# Patient Record
Sex: FEMALE | Race: WHITE | Employment: UNEMPLOYED | URBAN - METROPOLITAN AREA
[De-identification: names, ages, dates, MRNs, and addresses within clinical notes are randomized per-mention and may not be internally consistent; named-entity substitution may affect disease eponyms.]

---

## 2023-05-05 ENCOUNTER — HOSPITAL ENCOUNTER (INPATIENT)
Age: 88
LOS: 6 days | Discharge: NURSING, REHAB OR LONG-TERM CARE | DRG: 371 | End: 2023-05-11
Attending: INTERNAL MEDICINE
Payer: MEDICARE

## 2023-05-05 PROBLEM — G93.40 ACUTE ENCEPHALOPATHY: Status: ACTIVE | Noted: 2023-05-05

## 2023-05-05 PROCEDURE — 1200000000 HC SEMI PRIVATE

## 2023-05-05 RX ORDER — ACETAMINOPHEN 650 MG/1
650 SUPPOSITORY RECTAL EVERY 6 HOURS PRN
Status: DISCONTINUED | OUTPATIENT
Start: 2023-05-05 | End: 2023-05-11 | Stop reason: HOSPADM

## 2023-05-05 RX ORDER — PROMETHAZINE HYDROCHLORIDE 12.5 MG/1
12.5 TABLET ORAL EVERY 6 HOURS PRN
Status: DISCONTINUED | OUTPATIENT
Start: 2023-05-05 | End: 2023-05-11 | Stop reason: HOSPADM

## 2023-05-05 RX ORDER — POLYETHYLENE GLYCOL 3350 17 G/17G
17 POWDER, FOR SOLUTION ORAL DAILY PRN
Status: DISCONTINUED | OUTPATIENT
Start: 2023-05-05 | End: 2023-05-09

## 2023-05-05 RX ORDER — ONDANSETRON 2 MG/ML
4 INJECTION INTRAMUSCULAR; INTRAVENOUS EVERY 6 HOURS PRN
Status: DISCONTINUED | OUTPATIENT
Start: 2023-05-05 | End: 2023-05-11 | Stop reason: HOSPADM

## 2023-05-05 RX ORDER — SODIUM CHLORIDE 0.9 % (FLUSH) 0.9 %
10 SYRINGE (ML) INJECTION EVERY 12 HOURS SCHEDULED
Status: DISCONTINUED | OUTPATIENT
Start: 2023-05-05 | End: 2023-05-11 | Stop reason: HOSPADM

## 2023-05-05 RX ORDER — SODIUM CHLORIDE 9 MG/ML
INJECTION, SOLUTION INTRAVENOUS PRN
Status: DISCONTINUED | OUTPATIENT
Start: 2023-05-05 | End: 2023-05-11 | Stop reason: HOSPADM

## 2023-05-05 RX ORDER — SODIUM CHLORIDE 0.9 % (FLUSH) 0.9 %
10 SYRINGE (ML) INJECTION PRN
Status: DISCONTINUED | OUTPATIENT
Start: 2023-05-05 | End: 2023-05-11 | Stop reason: HOSPADM

## 2023-05-05 RX ORDER — ACETAMINOPHEN 325 MG/1
650 TABLET ORAL EVERY 6 HOURS PRN
Status: DISCONTINUED | OUTPATIENT
Start: 2023-05-05 | End: 2023-05-11 | Stop reason: HOSPADM

## 2023-05-06 PROBLEM — R90.89 ABNORMAL BRAIN MRI: Status: ACTIVE | Noted: 2023-05-06

## 2023-05-06 LAB
ALBUMIN SERPL-MCNC: 2.4 G/DL (ref 3.5–5.2)
ALP SERPL-CCNC: 137 U/L (ref 35–104)
ALT SERPL-CCNC: 39 U/L (ref 0–32)
ANION GAP SERPL CALCULATED.3IONS-SCNC: 11 MMOL/L (ref 7–16)
AST SERPL-CCNC: 39 U/L (ref 0–31)
BASOPHILS # BLD: 0.04 E9/L (ref 0–0.2)
BASOPHILS NFR BLD: 0.4 % (ref 0–2)
BILIRUB SERPL-MCNC: 1 MG/DL (ref 0–1.2)
BUN SERPL-MCNC: 20 MG/DL (ref 6–23)
CALCIUM SERPL-MCNC: 7.4 MG/DL (ref 8.6–10.2)
CHLORIDE SERPL-SCNC: 111 MMOL/L (ref 98–107)
CO2 SERPL-SCNC: 20 MMOL/L (ref 22–29)
CREAT SERPL-MCNC: 0.7 MG/DL (ref 0.5–1)
EOSINOPHIL # BLD: 0.19 E9/L (ref 0.05–0.5)
EOSINOPHIL NFR BLD: 1.7 % (ref 0–6)
ERYTHROCYTE [DISTWIDTH] IN BLOOD BY AUTOMATED COUNT: 14.7 FL (ref 11.5–15)
GLUCOSE SERPL-MCNC: 94 MG/DL (ref 74–99)
HCT VFR BLD AUTO: 33.3 % (ref 34–48)
HGB BLD-MCNC: 11 G/DL (ref 11.5–15.5)
IMM GRANULOCYTES # BLD: 0.06 E9/L
IMM GRANULOCYTES NFR BLD: 0.5 % (ref 0–5)
LYMPHOCYTES # BLD: 2.69 E9/L (ref 1.5–4)
LYMPHOCYTES NFR BLD: 24.1 % (ref 20–42)
MAGNESIUM SERPL-MCNC: 1.8 MG/DL (ref 1.6–2.6)
MCH RBC QN AUTO: 30.3 PG (ref 26–35)
MCHC RBC AUTO-ENTMCNC: 33 % (ref 32–34.5)
MCV RBC AUTO: 91.7 FL (ref 80–99.9)
MONOCYTES # BLD: 1.01 E9/L (ref 0.1–0.95)
MONOCYTES NFR BLD: 9 % (ref 2–12)
NEUTROPHILS # BLD: 7.18 E9/L (ref 1.8–7.3)
NEUTS SEG NFR BLD: 64.3 % (ref 43–80)
PLATELET # BLD AUTO: 189 E9/L (ref 130–450)
PMV BLD AUTO: 11.7 FL (ref 7–12)
POTASSIUM SERPL-SCNC: 3.4 MMOL/L (ref 3.5–5)
PROT SERPL-MCNC: 5 G/DL (ref 6.4–8.3)
RBC # BLD AUTO: 3.63 E12/L (ref 3.5–5.5)
SODIUM SERPL-SCNC: 142 MMOL/L (ref 132–146)
WBC # BLD: 11.2 E9/L (ref 4.5–11.5)

## 2023-05-06 PROCEDURE — 36415 COLL VENOUS BLD VENIPUNCTURE: CPT

## 2023-05-06 PROCEDURE — 83735 ASSAY OF MAGNESIUM: CPT

## 2023-05-06 PROCEDURE — 6370000000 HC RX 637 (ALT 250 FOR IP): Performed by: STUDENT IN AN ORGANIZED HEALTH CARE EDUCATION/TRAINING PROGRAM

## 2023-05-06 PROCEDURE — 2580000003 HC RX 258: Performed by: STUDENT IN AN ORGANIZED HEALTH CARE EDUCATION/TRAINING PROGRAM

## 2023-05-06 PROCEDURE — 85025 COMPLETE CBC W/AUTO DIFF WBC: CPT

## 2023-05-06 PROCEDURE — 2580000003 HC RX 258: Performed by: FAMILY MEDICINE

## 2023-05-06 PROCEDURE — 99222 1ST HOSP IP/OBS MODERATE 55: CPT | Performed by: NEUROLOGICAL SURGERY

## 2023-05-06 PROCEDURE — 1200000000 HC SEMI PRIVATE

## 2023-05-06 PROCEDURE — 6360000002 HC RX W HCPCS: Performed by: STUDENT IN AN ORGANIZED HEALTH CARE EDUCATION/TRAINING PROGRAM

## 2023-05-06 PROCEDURE — 80053 COMPREHEN METABOLIC PANEL: CPT

## 2023-05-06 RX ORDER — ACETAMINOPHEN 325 MG/1
650 TABLET ORAL EVERY 6 HOURS PRN
Status: DISCONTINUED | OUTPATIENT
Start: 2023-05-06 | End: 2023-05-06

## 2023-05-06 RX ORDER — POTASSIUM CHLORIDE 20 MEQ/1
40 TABLET, EXTENDED RELEASE ORAL PRN
Status: DISCONTINUED | OUTPATIENT
Start: 2023-05-06 | End: 2023-05-06 | Stop reason: SDUPTHER

## 2023-05-06 RX ORDER — POTASSIUM CHLORIDE 7.45 MG/ML
10 INJECTION INTRAVENOUS PRN
Status: DISCONTINUED | OUTPATIENT
Start: 2023-05-06 | End: 2023-05-11 | Stop reason: HOSPADM

## 2023-05-06 RX ORDER — ENOXAPARIN SODIUM 100 MG/ML
40 INJECTION SUBCUTANEOUS DAILY
Status: DISCONTINUED | OUTPATIENT
Start: 2023-05-06 | End: 2023-05-11 | Stop reason: HOSPADM

## 2023-05-06 RX ORDER — POTASSIUM CHLORIDE 7.45 MG/ML
10 INJECTION INTRAVENOUS PRN
Status: DISCONTINUED | OUTPATIENT
Start: 2023-05-06 | End: 2023-05-06 | Stop reason: SDUPTHER

## 2023-05-06 RX ORDER — ACETAMINOPHEN 650 MG/1
650 SUPPOSITORY RECTAL EVERY 6 HOURS PRN
Status: DISCONTINUED | OUTPATIENT
Start: 2023-05-06 | End: 2023-05-06

## 2023-05-06 RX ADMIN — SODIUM CHLORIDE, PRESERVATIVE FREE 10 ML: 5 INJECTION INTRAVENOUS at 22:03

## 2023-05-06 RX ADMIN — SODIUM CHLORIDE, PRESERVATIVE FREE 10 ML: 5 INJECTION INTRAVENOUS at 08:58

## 2023-05-06 RX ADMIN — VANCOMYCIN HYDROCHLORIDE 125 MG: 10 INJECTION, POWDER, LYOPHILIZED, FOR SOLUTION INTRAVENOUS at 22:03

## 2023-05-06 RX ADMIN — VANCOMYCIN HYDROCHLORIDE 125 MG: 10 INJECTION, POWDER, LYOPHILIZED, FOR SOLUTION INTRAVENOUS at 14:01

## 2023-05-06 RX ADMIN — ENOXAPARIN SODIUM 40 MG: 100 INJECTION SUBCUTANEOUS at 14:04

## 2023-05-06 RX ADMIN — POTASSIUM BICARBONATE 50 MEQ: 978 TABLET, EFFERVESCENT ORAL at 14:03

## 2023-05-06 RX ADMIN — SODIUM CHLORIDE, PRESERVATIVE FREE 10 ML: 5 INJECTION INTRAVENOUS at 01:12

## 2023-05-06 ASSESSMENT — PAIN SCALES - GENERAL
PAINLEVEL_OUTOF10: 2
PAINLEVEL_OUTOF10: 0

## 2023-05-07 LAB
ANION GAP SERPL CALCULATED.3IONS-SCNC: 10 MMOL/L (ref 7–16)
ANION GAP SERPL CALCULATED.3IONS-SCNC: 7 MMOL/L (ref 7–16)
BASOPHILS # BLD: 0.04 E9/L (ref 0–0.2)
BASOPHILS NFR BLD: 0.5 % (ref 0–2)
BUN SERPL-MCNC: 16 MG/DL (ref 6–23)
BUN SERPL-MCNC: 18 MG/DL (ref 6–23)
CALCIUM SERPL-MCNC: 6.9 MG/DL (ref 8.6–10.2)
CALCIUM SERPL-MCNC: 7.1 MG/DL (ref 8.6–10.2)
CHLORIDE SERPL-SCNC: 110 MMOL/L (ref 98–107)
CHLORIDE SERPL-SCNC: 115 MMOL/L (ref 98–107)
CO2 SERPL-SCNC: 22 MMOL/L (ref 22–29)
CO2 SERPL-SCNC: 23 MMOL/L (ref 22–29)
CREAT SERPL-MCNC: 0.6 MG/DL (ref 0.5–1)
CREAT SERPL-MCNC: 0.7 MG/DL (ref 0.5–1)
EOSINOPHIL # BLD: 0.06 E9/L (ref 0.05–0.5)
EOSINOPHIL NFR BLD: 0.8 % (ref 0–6)
ERYTHROCYTE [DISTWIDTH] IN BLOOD BY AUTOMATED COUNT: 15.2 FL (ref 11.5–15)
GLUCOSE SERPL-MCNC: 118 MG/DL (ref 74–99)
GLUCOSE SERPL-MCNC: 85 MG/DL (ref 74–99)
HCT VFR BLD AUTO: 30.6 % (ref 34–48)
HGB BLD-MCNC: 9.8 G/DL (ref 11.5–15.5)
IMM GRANULOCYTES # BLD: 0.03 E9/L
IMM GRANULOCYTES NFR BLD: 0.4 % (ref 0–5)
LYMPHOCYTES # BLD: 2.27 E9/L (ref 1.5–4)
LYMPHOCYTES NFR BLD: 28.8 % (ref 20–42)
MAGNESIUM SERPL-MCNC: 1.7 MG/DL (ref 1.6–2.6)
MCH RBC QN AUTO: 30.1 PG (ref 26–35)
MCHC RBC AUTO-ENTMCNC: 32 % (ref 32–34.5)
MCV RBC AUTO: 93.9 FL (ref 80–99.9)
MONOCYTES # BLD: 0.87 E9/L (ref 0.1–0.95)
MONOCYTES NFR BLD: 11 % (ref 2–12)
NEUTROPHILS # BLD: 4.61 E9/L (ref 1.8–7.3)
NEUTS SEG NFR BLD: 58.5 % (ref 43–80)
PLATELET # BLD AUTO: 194 E9/L (ref 130–450)
PMV BLD AUTO: 11.9 FL (ref 7–12)
POTASSIUM SERPL-SCNC: 3.4 MMOL/L (ref 3.5–5)
POTASSIUM SERPL-SCNC: 3.9 MMOL/L (ref 3.5–5)
RBC # BLD AUTO: 3.26 E12/L (ref 3.5–5.5)
SODIUM SERPL-SCNC: 140 MMOL/L (ref 132–146)
SODIUM SERPL-SCNC: 147 MMOL/L (ref 132–146)
WBC # BLD: 7.9 E9/L (ref 4.5–11.5)

## 2023-05-07 PROCEDURE — 80048 BASIC METABOLIC PNL TOTAL CA: CPT

## 2023-05-07 PROCEDURE — 2580000003 HC RX 258: Performed by: STUDENT IN AN ORGANIZED HEALTH CARE EDUCATION/TRAINING PROGRAM

## 2023-05-07 PROCEDURE — 36415 COLL VENOUS BLD VENIPUNCTURE: CPT

## 2023-05-07 PROCEDURE — 1200000000 HC SEMI PRIVATE

## 2023-05-07 PROCEDURE — 83735 ASSAY OF MAGNESIUM: CPT

## 2023-05-07 PROCEDURE — 2580000003 HC RX 258: Performed by: FAMILY MEDICINE

## 2023-05-07 PROCEDURE — 85025 COMPLETE CBC W/AUTO DIFF WBC: CPT

## 2023-05-07 PROCEDURE — 6360000002 HC RX W HCPCS: Performed by: STUDENT IN AN ORGANIZED HEALTH CARE EDUCATION/TRAINING PROGRAM

## 2023-05-07 RX ORDER — DEXTROSE MONOHYDRATE 50 MG/ML
INJECTION, SOLUTION INTRAVENOUS CONTINUOUS
Status: DISCONTINUED | OUTPATIENT
Start: 2023-05-07 | End: 2023-05-08

## 2023-05-07 RX ADMIN — ENOXAPARIN SODIUM 40 MG: 100 INJECTION SUBCUTANEOUS at 08:38

## 2023-05-07 RX ADMIN — VANCOMYCIN HYDROCHLORIDE 125 MG: 10 INJECTION, POWDER, LYOPHILIZED, FOR SOLUTION INTRAVENOUS at 13:30

## 2023-05-07 RX ADMIN — DEXTROSE MONOHYDRATE: 50 INJECTION, SOLUTION INTRAVENOUS at 11:03

## 2023-05-07 RX ADMIN — VANCOMYCIN HYDROCHLORIDE 125 MG: 10 INJECTION, POWDER, LYOPHILIZED, FOR SOLUTION INTRAVENOUS at 04:04

## 2023-05-07 RX ADMIN — VANCOMYCIN HYDROCHLORIDE 125 MG: 10 INJECTION, POWDER, LYOPHILIZED, FOR SOLUTION INTRAVENOUS at 21:13

## 2023-05-07 RX ADMIN — SODIUM CHLORIDE, PRESERVATIVE FREE 10 ML: 5 INJECTION INTRAVENOUS at 08:39

## 2023-05-07 RX ADMIN — SODIUM CHLORIDE, PRESERVATIVE FREE 10 ML: 5 INJECTION INTRAVENOUS at 21:13

## 2023-05-07 RX ADMIN — VANCOMYCIN HYDROCHLORIDE 125 MG: 10 INJECTION, POWDER, LYOPHILIZED, FOR SOLUTION INTRAVENOUS at 08:39

## 2023-05-07 ASSESSMENT — PAIN SCALES - GENERAL
PAINLEVEL_OUTOF10: 0
PAINLEVEL_OUTOF10: 0

## 2023-05-08 LAB
ANION GAP SERPL CALCULATED.3IONS-SCNC: 7 MMOL/L (ref 7–16)
BASOPHILS # BLD: 0.07 E9/L (ref 0–0.2)
BASOPHILS NFR BLD: 1 % (ref 0–2)
BUN SERPL-MCNC: 19 MG/DL (ref 6–23)
CALCIUM SERPL-MCNC: 6.8 MG/DL (ref 8.6–10.2)
CHLORIDE SERPL-SCNC: 108 MMOL/L (ref 98–107)
CO2 SERPL-SCNC: 20 MMOL/L (ref 22–29)
CREAT SERPL-MCNC: 0.5 MG/DL (ref 0.5–1)
EOSINOPHIL # BLD: 0.06 E9/L (ref 0.05–0.5)
EOSINOPHIL NFR BLD: 0.9 % (ref 0–6)
ERYTHROCYTE [DISTWIDTH] IN BLOOD BY AUTOMATED COUNT: 16.2 FL (ref 11.5–15)
GLUCOSE SERPL-MCNC: 121 MG/DL (ref 74–99)
HCT VFR BLD AUTO: 30.4 % (ref 34–48)
HGB BLD-MCNC: 9.5 G/DL (ref 11.5–15.5)
IMM GRANULOCYTES # BLD: 0.02 E9/L
IMM GRANULOCYTES NFR BLD: 0.3 % (ref 0–5)
LYMPHOCYTES # BLD: 1.83 E9/L (ref 1.5–4)
LYMPHOCYTES NFR BLD: 26.4 % (ref 20–42)
MCH RBC QN AUTO: 30.8 PG (ref 26–35)
MCHC RBC AUTO-ENTMCNC: 31.3 % (ref 32–34.5)
MCV RBC AUTO: 98.7 FL (ref 80–99.9)
MONOCYTES # BLD: 0.8 E9/L (ref 0.1–0.95)
MONOCYTES NFR BLD: 11.6 % (ref 2–12)
NEUTROPHILS # BLD: 4.14 E9/L (ref 1.8–7.3)
NEUTS SEG NFR BLD: 59.8 % (ref 43–80)
PLATELET # BLD AUTO: 76 E9/L (ref 130–450)
PLATELET CONFIRMATION: NORMAL
PMV BLD AUTO: 12.4 FL (ref 7–12)
POTASSIUM SERPL-SCNC: 4.1 MMOL/L (ref 3.5–5)
RBC # BLD AUTO: 3.08 E12/L (ref 3.5–5.5)
SODIUM SERPL-SCNC: 135 MMOL/L (ref 132–146)
WBC # BLD: 6.9 E9/L (ref 4.5–11.5)

## 2023-05-08 PROCEDURE — 2580000003 HC RX 258: Performed by: FAMILY MEDICINE

## 2023-05-08 PROCEDURE — 6360000002 HC RX W HCPCS: Performed by: STUDENT IN AN ORGANIZED HEALTH CARE EDUCATION/TRAINING PROGRAM

## 2023-05-08 PROCEDURE — 97530 THERAPEUTIC ACTIVITIES: CPT

## 2023-05-08 PROCEDURE — 80048 BASIC METABOLIC PNL TOTAL CA: CPT

## 2023-05-08 PROCEDURE — 2500000003 HC RX 250 WO HCPCS: Performed by: STUDENT IN AN ORGANIZED HEALTH CARE EDUCATION/TRAINING PROGRAM

## 2023-05-08 PROCEDURE — 36415 COLL VENOUS BLD VENIPUNCTURE: CPT

## 2023-05-08 PROCEDURE — 97535 SELF CARE MNGMENT TRAINING: CPT

## 2023-05-08 PROCEDURE — 97161 PT EVAL LOW COMPLEX 20 MIN: CPT

## 2023-05-08 PROCEDURE — 6370000000 HC RX 637 (ALT 250 FOR IP): Performed by: STUDENT IN AN ORGANIZED HEALTH CARE EDUCATION/TRAINING PROGRAM

## 2023-05-08 PROCEDURE — 97165 OT EVAL LOW COMPLEX 30 MIN: CPT

## 2023-05-08 PROCEDURE — 85025 COMPLETE CBC W/AUTO DIFF WBC: CPT

## 2023-05-08 PROCEDURE — 1200000000 HC SEMI PRIVATE

## 2023-05-08 RX ADMIN — MICONAZOLE NITRATE: 20.6 POWDER TOPICAL at 22:02

## 2023-05-08 RX ADMIN — VANCOMYCIN HYDROCHLORIDE 125 MG: 10 INJECTION, POWDER, LYOPHILIZED, FOR SOLUTION INTRAVENOUS at 02:20

## 2023-05-08 RX ADMIN — ENOXAPARIN SODIUM 40 MG: 100 INJECTION SUBCUTANEOUS at 09:33

## 2023-05-08 RX ADMIN — VANCOMYCIN HYDROCHLORIDE 125 MG: 10 INJECTION, POWDER, LYOPHILIZED, FOR SOLUTION INTRAVENOUS at 15:21

## 2023-05-08 RX ADMIN — VANCOMYCIN HYDROCHLORIDE 125 MG: 10 INJECTION, POWDER, LYOPHILIZED, FOR SOLUTION INTRAVENOUS at 21:57

## 2023-05-08 RX ADMIN — MICONAZOLE NITRATE: 2 OINTMENT TOPICAL at 21:57

## 2023-05-08 RX ADMIN — SODIUM CHLORIDE, PRESERVATIVE FREE 10 ML: 5 INJECTION INTRAVENOUS at 09:33

## 2023-05-08 RX ADMIN — VANCOMYCIN HYDROCHLORIDE 125 MG: 10 INJECTION, POWDER, LYOPHILIZED, FOR SOLUTION INTRAVENOUS at 09:34

## 2023-05-08 RX ADMIN — SODIUM CHLORIDE, PRESERVATIVE FREE 10 ML: 5 INJECTION INTRAVENOUS at 21:58

## 2023-05-08 NOTE — ACP (ADVANCE CARE PLANNING)
Advance Care Planning   Healthcare Decision Maker:    Primary Decision Maker: Arun Nash - Child - [phone]    Secondary Decision Maker: Wm Horvath - Child - [phone]    Click here to complete Healthcare Decision Makers including selection of the Healthcare Decision Maker Relationship (ie \"Primary\").

## 2023-05-08 NOTE — CARE COORDINATION
Met with patient and son at bedside to discuss trasnition of care. Pt lived alone pta. He would like [facility] at discharge. Cjoiced for #1 [address] and $+#2 [facility]. Referral pending to [facility] with [address]. If accepted will initiate precert. PASRR, ambulance form and envelope on soft chart. ID consulted for Cdiff (4/27 +), PO Vanc to continue until 5/15. Neuro surgery signed off, follow up in office 12 month with repeat MRI. CM to follow (TF)    Ivy Bennett [facility]  Case Management  [phone]      Case Management Assessment  Initial Evaluation    Date/Time of Evaluation: 5/8/2023 2:09 PM  Assessment Completed by: Ivy Bennett RN    If patient is discharged prior to next notation, then this note serves as note for discharge by case management. Patient Name: Jed Kayser                   YOB: 1935  Diagnosis: Acute encephalopathy [G93.40]                   Date / Time: 5/5/2023  8:57 PM    Patient Admission Status: Inpatient   Readmission Risk (Low < 19, Mod (19-27), High > 27): Readmission Risk Score: 11.8    Current PCP: Andrew David MD  PCP verified by CM? Yes    Chart Reviewed: Yes      History Provided by: Child/Family  Patient Orientation: Self    Patient Cognition: Alert    Hospitalization in the last 30 days (Readmission):  No    If yes, Readmission Assessment in  Navigator will be completed.     Advance Directives:      Code Status: Full Code   Patient's Primary Decision Maker is: Legal Next of Kin    Primary Decision Maker: [id number] - Child - [phone]    Secondary Decision Maker: Wm Horvath - Child - [phone]    Discharge Planning:    Patient lives with: Children Type of Home: House  Primary Care Giver: Self  Patient Support Systems include: Children   Current Financial resources:    Current community resources:    Current services prior to admission:              Current DME:              Type of Home Care services:       ADLS  Prior

## 2023-05-09 LAB
ANION GAP SERPL CALCULATED.3IONS-SCNC: 5 MMOL/L (ref 7–16)
BASOPHILS # BLD: 0.05 E9/L (ref 0–0.2)
BASOPHILS NFR BLD: 0.8 % (ref 0–2)
BUN SERPL-MCNC: 16 MG/DL (ref 6–23)
CALCIUM SERPL-MCNC: 7 MG/DL (ref 8.6–10.2)
CHLORIDE SERPL-SCNC: 109 MMOL/L (ref 98–107)
CO2 SERPL-SCNC: 24 MMOL/L (ref 22–29)
CREAT SERPL-MCNC: 0.9 MG/DL (ref 0.5–1)
EOSINOPHIL # BLD: 0.05 E9/L (ref 0.05–0.5)
EOSINOPHIL NFR BLD: 0.8 % (ref 0–6)
ERYTHROCYTE [DISTWIDTH] IN BLOOD BY AUTOMATED COUNT: 17 FL (ref 11.5–15)
GLUCOSE SERPL-MCNC: 94 MG/DL (ref 74–99)
HCT VFR BLD AUTO: 27.3 % (ref 34–48)
HGB BLD-MCNC: 8.8 G/DL (ref 11.5–15.5)
IMM GRANULOCYTES # BLD: 0.02 E9/L
IMM GRANULOCYTES NFR BLD: 0.3 % (ref 0–5)
LYMPHOCYTES # BLD: 2.05 E9/L (ref 1.5–4)
LYMPHOCYTES NFR BLD: 32.3 % (ref 20–42)
MAGNESIUM SERPL-MCNC: 1.8 MG/DL (ref 1.6–2.6)
MCH RBC QN AUTO: 30.6 PG (ref 26–35)
MCHC RBC AUTO-ENTMCNC: 32.2 % (ref 32–34.5)
MCV RBC AUTO: 94.8 FL (ref 80–99.9)
MONOCYTES # BLD: 0.75 E9/L (ref 0.1–0.95)
MONOCYTES NFR BLD: 11.8 % (ref 2–12)
NEUTROPHILS # BLD: 3.42 E9/L (ref 1.8–7.3)
NEUTS SEG NFR BLD: 54 % (ref 43–80)
PLATELET # BLD AUTO: 238 E9/L (ref 130–450)
PMV BLD AUTO: 10.7 FL (ref 7–12)
POTASSIUM SERPL-SCNC: 3.4 MMOL/L (ref 3.5–5)
RBC # BLD AUTO: 2.88 E12/L (ref 3.5–5.5)
SODIUM SERPL-SCNC: 138 MMOL/L (ref 132–146)
WBC # BLD: 6.3 E9/L (ref 4.5–11.5)

## 2023-05-09 PROCEDURE — 1200000000 HC SEMI PRIVATE

## 2023-05-09 PROCEDURE — 85025 COMPLETE CBC W/AUTO DIFF WBC: CPT

## 2023-05-09 PROCEDURE — 6370000000 HC RX 637 (ALT 250 FOR IP): Performed by: STUDENT IN AN ORGANIZED HEALTH CARE EDUCATION/TRAINING PROGRAM

## 2023-05-09 PROCEDURE — 6370000000 HC RX 637 (ALT 250 FOR IP): Performed by: INTERNAL MEDICINE

## 2023-05-09 PROCEDURE — 87070 CULTURE OTHR SPECIMN AEROBIC: CPT

## 2023-05-09 PROCEDURE — 2580000003 HC RX 258: Performed by: FAMILY MEDICINE

## 2023-05-09 PROCEDURE — 83735 ASSAY OF MAGNESIUM: CPT

## 2023-05-09 PROCEDURE — 6360000002 HC RX W HCPCS: Performed by: STUDENT IN AN ORGANIZED HEALTH CARE EDUCATION/TRAINING PROGRAM

## 2023-05-09 PROCEDURE — 36415 COLL VENOUS BLD VENIPUNCTURE: CPT

## 2023-05-09 PROCEDURE — 80048 BASIC METABOLIC PNL TOTAL CA: CPT

## 2023-05-09 RX ORDER — LACTOBACILLUS RHAMNOSUS GG 10B CELL
1 CAPSULE ORAL DAILY
Status: DISCONTINUED | OUTPATIENT
Start: 2023-05-09 | End: 2023-05-11 | Stop reason: HOSPADM

## 2023-05-09 RX ADMIN — MICONAZOLE NITRATE: 20.6 POWDER TOPICAL at 19:48

## 2023-05-09 RX ADMIN — VANCOMYCIN HYDROCHLORIDE 125 MG: 10 INJECTION, POWDER, LYOPHILIZED, FOR SOLUTION INTRAVENOUS at 13:46

## 2023-05-09 RX ADMIN — MICONAZOLE NITRATE: 2 OINTMENT TOPICAL at 19:48

## 2023-05-09 RX ADMIN — Medication 1 CAPSULE: at 16:38

## 2023-05-09 RX ADMIN — ENOXAPARIN SODIUM 40 MG: 100 INJECTION SUBCUTANEOUS at 08:32

## 2023-05-09 RX ADMIN — VANCOMYCIN HYDROCHLORIDE 125 MG: 10 INJECTION, POWDER, LYOPHILIZED, FOR SOLUTION INTRAVENOUS at 08:32

## 2023-05-09 RX ADMIN — MICONAZOLE NITRATE: 2 OINTMENT TOPICAL at 08:33

## 2023-05-09 RX ADMIN — SODIUM CHLORIDE, PRESERVATIVE FREE 10 ML: 5 INJECTION INTRAVENOUS at 08:32

## 2023-05-09 RX ADMIN — VANCOMYCIN HYDROCHLORIDE 125 MG: 10 INJECTION, POWDER, LYOPHILIZED, FOR SOLUTION INTRAVENOUS at 03:04

## 2023-05-09 RX ADMIN — POTASSIUM BICARBONATE 40 MEQ: 782 TABLET, EFFERVESCENT ORAL at 11:41

## 2023-05-09 RX ADMIN — MICONAZOLE NITRATE: 20.6 POWDER TOPICAL at 08:32

## 2023-05-09 RX ADMIN — VANCOMYCIN HYDROCHLORIDE 125 MG: 10 INJECTION, POWDER, LYOPHILIZED, FOR SOLUTION INTRAVENOUS at 19:47

## 2023-05-09 ASSESSMENT — PAIN SCALES - GENERAL: PAINLEVEL_OUTOF10: 0

## 2023-05-09 NOTE — CARE COORDINATION
Received call from Lady Lara with the [facility], they are out of network. Referral called to [facility]. Started precert today. If auth received, physician is ready to discharge. PASRR, ambulance form and envelope on soft chart.  CM to follow (TF)    Ran Artis  Case Management  [phone]

## 2023-05-09 NOTE — PLAN OF CARE
Problem: Safety - Adult  Goal: Free from fall injury  5/9/2023 0807 by Cadence Mancera RN  Outcome: Progressing  5/9/2023 0021 by Jeremias Muñoz RN  Outcome: Progressing     Problem: ABCDS Injury Assessment  Goal: Absence of physical injury  Outcome: Progressing     Problem: Skin/Tissue Integrity  Goal: Absence of new skin breakdown  Description: 1. Monitor for areas of redness and/or skin breakdown  2. Assess vascular access sites hourly  3. Every 4-6 hours minimum:  Change oxygen saturation probe site  4. Every 4-6 hours:  If on nasal continuous positive airway pressure, respiratory therapy assess nares and determine need for appliance change or resting period.   5/9/2023 0807 by Cadence Mancera RN  Outcome: Progressing  5/9/2023 0021 by Jeremias Muñoz RN  Outcome: Progressing

## 2023-05-09 NOTE — DISCHARGE INSTR - COC
Continuity of Care Form    Patient Name: Dominique Dozier   :  1935  MRN:  [id number]    Admit date:  2023  Discharge date:  23    Code Status Order: Full Code   Advance Directives:     Admitting Physician:  No admitting provider for patient encounter. PCP: Tiffanie Benitez MD    Discharging Nurse: Real Carrillo [address] Unit/Room#: 9983/8848-K  Discharging Unit Phone Number: [phone]    Emergency Contact:   Extended Emergency Contact Information  Primary Emergency Contact: [facility]  Mobile Phone: [phone]  Relation: Child  Preferred language: English   needed? No  Secondary Emergency Contact: Wm Horvath  Home Phone: [phone]  Relation: Child    Past Surgical History:  No past surgical history on file. Immunization History: There is no immunization history on file for this patient.     Active Problems:  Patient Active Problem List   Diagnosis Code    Acute encephalopathy G93.40    Abnormal brain MRI R90.89       Isolation/Infection:   Isolation            C Diff Contact          Patient Infection Status       Infection Onset Added Last Indicated Last Indicated By Review Planned Expiration Resolved Resolved By    C-diff (Clostridium difficile)  23 Quinton Cline RN 23      Molecular test performed utilizing real-time polymerase   chain reaction (PCR)     POSITIVE: TOXIGENIC C.DIFFICILE DNA DETECTED-23  Raquel  23            Nurse Assessment:  Last Vital Signs: /80   Pulse 89   Temp 98 °F (36.7 °C) (Temporal)   Resp 17   Ht 4' 9\" (1.448 m)   Wt 151 lb (68.5 kg)   SpO2 92%   BMI 32.68 kg/m²     Last documented pain score (0-10 scale): Pain Level: 0  Last Weight:   Wt Readings from Last 1 Encounters:   23 151 lb (68.5 kg)     Mental Status:  oriented and alert    IV Access:  - None    Nursing Mobility/ADLs:  Walking   Assisted  Transfer  Assisted  Bathing  Assisted  Dressing

## 2023-05-10 ENCOUNTER — APPOINTMENT (OUTPATIENT)
Dept: ULTRASOUND IMAGING | Age: 88
DRG: 371 | End: 2023-05-10
Attending: INTERNAL MEDICINE
Payer: MEDICARE

## 2023-05-10 DIAGNOSIS — G93.40 ACUTE ENCEPHALOPATHY: Primary | ICD-10-CM

## 2023-05-10 DIAGNOSIS — R90.89 ABNORMAL BRAIN MRI: ICD-10-CM

## 2023-05-10 LAB
ANION GAP SERPL CALCULATED.3IONS-SCNC: 8 MMOL/L (ref 7–16)
BASOPHILS # BLD: 0.06 E9/L (ref 0–0.2)
BASOPHILS NFR BLD: 1.2 % (ref 0–2)
BUN SERPL-MCNC: 10 MG/DL (ref 6–23)
CALCIUM SERPL-MCNC: 7.3 MG/DL (ref 8.6–10.2)
CHLORIDE SERPL-SCNC: 108 MMOL/L (ref 98–107)
CO2 SERPL-SCNC: 23 MMOL/L (ref 22–29)
CREAT SERPL-MCNC: 0.5 MG/DL (ref 0.5–1)
EOSINOPHIL # BLD: 0.04 E9/L (ref 0.05–0.5)
EOSINOPHIL NFR BLD: 0.8 % (ref 0–6)
ERYTHROCYTE [DISTWIDTH] IN BLOOD BY AUTOMATED COUNT: 18 FL (ref 11.5–15)
GLUCOSE SERPL-MCNC: 83 MG/DL (ref 74–99)
HCT VFR BLD AUTO: 28.7 % (ref 34–48)
HGB BLD-MCNC: 9.3 G/DL (ref 11.5–15.5)
IMM GRANULOCYTES # BLD: 0.01 E9/L
IMM GRANULOCYTES NFR BLD: 0.2 % (ref 0–5)
LYMPHOCYTES # BLD: 1.75 E9/L (ref 1.5–4)
LYMPHOCYTES NFR BLD: 34.1 % (ref 20–42)
MCH RBC QN AUTO: 31.3 PG (ref 26–35)
MCHC RBC AUTO-ENTMCNC: 32.4 % (ref 32–34.5)
MCV RBC AUTO: 96.6 FL (ref 80–99.9)
MONOCYTES # BLD: 0.63 E9/L (ref 0.1–0.95)
MONOCYTES NFR BLD: 12.3 % (ref 2–12)
NEUTROPHILS # BLD: 2.64 E9/L (ref 1.8–7.3)
NEUTS SEG NFR BLD: 51.4 % (ref 43–80)
PLATELET # BLD AUTO: 244 E9/L (ref 130–450)
PMV BLD AUTO: 10.5 FL (ref 7–12)
POTASSIUM SERPL-SCNC: 4 MMOL/L (ref 3.5–5)
RBC # BLD AUTO: 2.97 E12/L (ref 3.5–5.5)
SODIUM SERPL-SCNC: 139 MMOL/L (ref 132–146)
WBC # BLD: 5.1 E9/L (ref 4.5–11.5)

## 2023-05-10 PROCEDURE — 93971 EXTREMITY STUDY: CPT | Performed by: RADIOLOGY

## 2023-05-10 PROCEDURE — 1200000000 HC SEMI PRIVATE

## 2023-05-10 PROCEDURE — 6370000000 HC RX 637 (ALT 250 FOR IP): Performed by: INTERNAL MEDICINE

## 2023-05-10 PROCEDURE — 36415 COLL VENOUS BLD VENIPUNCTURE: CPT

## 2023-05-10 PROCEDURE — 85025 COMPLETE CBC W/AUTO DIFF WBC: CPT

## 2023-05-10 PROCEDURE — 6360000002 HC RX W HCPCS: Performed by: STUDENT IN AN ORGANIZED HEALTH CARE EDUCATION/TRAINING PROGRAM

## 2023-05-10 PROCEDURE — 80048 BASIC METABOLIC PNL TOTAL CA: CPT

## 2023-05-10 RX ORDER — LACTOBACILLUS RHAMNOSUS GG 10B CELL
1 CAPSULE ORAL DAILY
Qty: 30 CAPSULE | Refills: 0 | Status: SHIPPED | OUTPATIENT
Start: 2023-05-10

## 2023-05-10 RX ADMIN — Medication 1 CAPSULE: at 09:11

## 2023-05-10 RX ADMIN — ENOXAPARIN SODIUM 40 MG: 100 INJECTION SUBCUTANEOUS at 09:11

## 2023-05-10 RX ADMIN — MICONAZOLE NITRATE: 20.6 POWDER TOPICAL at 20:54

## 2023-05-10 RX ADMIN — VANCOMYCIN HYDROCHLORIDE 125 MG: 10 INJECTION, POWDER, LYOPHILIZED, FOR SOLUTION INTRAVENOUS at 09:10

## 2023-05-10 RX ADMIN — MICONAZOLE NITRATE: 20.6 POWDER TOPICAL at 09:16

## 2023-05-10 RX ADMIN — MICONAZOLE NITRATE: 2 OINTMENT TOPICAL at 09:16

## 2023-05-10 RX ADMIN — VANCOMYCIN HYDROCHLORIDE 125 MG: 10 INJECTION, POWDER, LYOPHILIZED, FOR SOLUTION INTRAVENOUS at 01:51

## 2023-05-10 RX ADMIN — VANCOMYCIN HYDROCHLORIDE 125 MG: 10 INJECTION, POWDER, LYOPHILIZED, FOR SOLUTION INTRAVENOUS at 20:54

## 2023-05-10 RX ADMIN — MICONAZOLE NITRATE: 2 OINTMENT TOPICAL at 20:54

## 2023-05-10 ASSESSMENT — PAIN SCALES - GENERAL: PAINLEVEL_OUTOF10: 0

## 2023-05-10 NOTE — PLAN OF CARE
Problem: Safety - Adult  Goal: Free from fall injury  Outcome: Completed     Problem: ABCDS Injury Assessment  Goal: Absence of physical injury  Outcome: Completed     Problem: Skin/Tissue Integrity  Goal: Absence of new skin breakdown  Description: 1. Monitor for areas of redness and/or skin breakdown  2. Assess vascular access sites hourly  3. Every 4-6 hours minimum:  Change oxygen saturation probe site  4. Every 4-6 hours:  If on nasal continuous positive airway pressure, respiratory therapy assess nares and determine need for appliance change or resting period.   Outcome: Completed

## 2023-05-10 NOTE — DISCHARGE SUMMARY
Hospitalist Discharge Summary    Patient ID: Shin Flores   Patient : 1935  Patient's PCP: Dani Hunter MD    Admit Date: 2023   Admitting Physician: No admitting provider for patient encounter. Discharge Date:  5/10/2023   Discharge Physician: Samm Garcia MD   Discharge Condition: Stable  Discharge Disposition: [facility] course in brief:  (Please refer to daily progress notes for a comprehensive review of the hospitalization by requesting medical records)  Patient admitted on 2023   Admitted with acute encephalopathy, C. difficile colitis. 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 Seen by neurosurgery no intervention. Outpatient follow-up. Patient is status post vancomycin and cefepime, acyclovir prior to transfer to [facility].  Mental status improving. Decreased diarrhea frequency. Currently on oral vancomycin until 5/15/2023. Infectious disease were on board during admission. Noted to have hypernatremia which resolved with dextrose infusion. Patient improved with the loose stools and electrolyte abnormalities. PT/OT were consulted and patient was deemed stable for discharge to subacute rehab. Patient did have left upper extremity swelling and pain-ultrasound was ordered to rule out DVT. Consults:   IP CONSULT TO NEUROSURGERY  IP CONSULT TO INFECTIOUS DISEASES    Discharge Diagnoses:  Acute Metabolic encephalopathy resolved  Low-grade glioma  C. Difficile Colitis  Hypernatremia resolved  Hypokalemia resolved  Rhabdomyolysis  Hypertension  Hyperlipidemia  Dysphagia      Discharge Instructions / Follow up: Follow-up with PCP within 1 week of discharge. Follow-up with consultants as indicated by them. Compliance with medications as prescribed on discharge. Future Appointments   Date Time Provider Abraham Landry   2023  3:00 PM Kesha Peña MD [facility]       The patient's condition is stable.   At this

## 2023-05-10 NOTE — CARE COORDINATION
Discharge order noted. Precert pending to [facility], initiated yesterday 5/9. Liason aware patient is ready for discharge. They can not accept pending. HENS, ambulance form and envelope on soft chart.  CM to follow (TF)    Jaswant Muñoz Labor  Case Management  [phone]

## 2023-05-11 VITALS
WEIGHT: 151 LBS | DIASTOLIC BLOOD PRESSURE: 71 MMHG | HEART RATE: 90 BPM | OXYGEN SATURATION: 96 % | RESPIRATION RATE: 16 BRPM | SYSTOLIC BLOOD PRESSURE: 131 MMHG | HEIGHT: 57 IN | TEMPERATURE: 98.4 F | BODY MASS INDEX: 32.58 KG/M2

## 2023-05-11 LAB
ANION GAP SERPL CALCULATED.3IONS-SCNC: 7 MMOL/L (ref 7–16)
BASOPHILS # BLD: 0.06 E9/L (ref 0–0.2)
BASOPHILS NFR BLD: 1.2 % (ref 0–2)
BUN SERPL-MCNC: 9 MG/DL (ref 6–23)
CALCIUM SERPL-MCNC: 7.3 MG/DL (ref 8.6–10.2)
CHLORIDE SERPL-SCNC: 111 MMOL/L (ref 98–107)
CO2 SERPL-SCNC: 25 MMOL/L (ref 22–29)
CREAT SERPL-MCNC: 0.5 MG/DL (ref 0.5–1)
EOSINOPHIL # BLD: 0.07 E9/L (ref 0.05–0.5)
EOSINOPHIL NFR BLD: 1.4 % (ref 0–6)
ERYTHROCYTE [DISTWIDTH] IN BLOOD BY AUTOMATED COUNT: 18.4 FL (ref 11.5–15)
GLUCOSE SERPL-MCNC: 82 MG/DL (ref 74–99)
HCT VFR BLD AUTO: 28 % (ref 34–48)
HGB BLD-MCNC: 8.8 G/DL (ref 11.5–15.5)
IMM GRANULOCYTES # BLD: 0.02 E9/L
IMM GRANULOCYTES NFR BLD: 0.4 % (ref 0–5)
LYMPHOCYTES # BLD: 1.73 E9/L (ref 1.5–4)
LYMPHOCYTES NFR BLD: 35 % (ref 20–42)
MCH RBC QN AUTO: 30.8 PG (ref 26–35)
MCHC RBC AUTO-ENTMCNC: 31.4 % (ref 32–34.5)
MCV RBC AUTO: 97.9 FL (ref 80–99.9)
MONOCYTES # BLD: 0.58 E9/L (ref 0.1–0.95)
MONOCYTES NFR BLD: 11.7 % (ref 2–12)
NEUTROPHILS # BLD: 2.48 E9/L (ref 1.8–7.3)
NEUTS SEG NFR BLD: 50.3 % (ref 43–80)
PLATELET # BLD AUTO: 273 E9/L (ref 130–450)
PMV BLD AUTO: 10.7 FL (ref 7–12)
POTASSIUM SERPL-SCNC: 3.9 MMOL/L (ref 3.5–5)
RBC # BLD AUTO: 2.86 E12/L (ref 3.5–5.5)
SODIUM SERPL-SCNC: 143 MMOL/L (ref 132–146)
WBC # BLD: 4.9 E9/L (ref 4.5–11.5)

## 2023-05-11 PROCEDURE — 80048 BASIC METABOLIC PNL TOTAL CA: CPT

## 2023-05-11 PROCEDURE — 85025 COMPLETE CBC W/AUTO DIFF WBC: CPT

## 2023-05-11 PROCEDURE — 6360000002 HC RX W HCPCS: Performed by: STUDENT IN AN ORGANIZED HEALTH CARE EDUCATION/TRAINING PROGRAM

## 2023-05-11 PROCEDURE — 6370000000 HC RX 637 (ALT 250 FOR IP): Performed by: INTERNAL MEDICINE

## 2023-05-11 PROCEDURE — 36415 COLL VENOUS BLD VENIPUNCTURE: CPT

## 2023-05-11 RX ADMIN — MICONAZOLE NITRATE: 20.6 POWDER TOPICAL at 09:24

## 2023-05-11 RX ADMIN — MICONAZOLE NITRATE: 2 OINTMENT TOPICAL at 09:24

## 2023-05-11 RX ADMIN — ENOXAPARIN SODIUM 40 MG: 100 INJECTION SUBCUTANEOUS at 08:34

## 2023-05-11 RX ADMIN — VANCOMYCIN HYDROCHLORIDE 125 MG: 10 INJECTION, POWDER, LYOPHILIZED, FOR SOLUTION INTRAVENOUS at 02:29

## 2023-05-11 RX ADMIN — Medication 1 CAPSULE: at 08:34

## 2023-05-11 RX ADMIN — VANCOMYCIN HYDROCHLORIDE 125 MG: 10 INJECTION, POWDER, LYOPHILIZED, FOR SOLUTION INTRAVENOUS at 08:34

## 2023-05-11 NOTE — PROGRESS NOTES
[address] [place] [address] [facility]  Progress note  C/C: C diff infection       Pt is awake and alert  Still having diarrhea  Afebrile      Current Facility-Administered Medications   Medication Dose Route Frequency Provider Last Rate Last Admin    miconazole (MICOTIN) 2 % powder   Topical BID Praveen Yun MD   Given at 05/09/23 5944    miconazole nitrate 2 % ointment   Topical BID Praveen Yun MD   Given at 05/09/23 0833    enoxaparin (LOVENOX) injection 40 mg  40 mg SubCUTAneous Daily Suad Walker MD   40 mg at 05/09/23 0832    vancomycin (VANCOCIN) oral solution 125 mg  125 mg Oral 4 times per day Adelina Bright MD   125 mg at 05/09/23 1346    potassium bicarbonate (K-LYTE) disintegrating tablet 50 mEq  50 mEq Oral PRN Praveen Yun MD   50 mEq at 05/06/23 1403    Or    potassium bicarb-citric acid (EFFER-K) effervescent tablet 40 mEq  40 mEq Oral PRN Suad Walker MD   40 mEq at 05/09/23 1141    Or    potassium chloride 10 mEq/100 mL IVPB (Peripheral Line)  10 mEq IntraVENous PRN Suad Walker MD        sodium chloride flush 0.9 % injection 10 mL  10 mL IntraVENous 2 times per day Lauraine Cayden, DO   10 mL at 05/09/23 0832    sodium chloride flush 0.9 % injection 10 mL  10 mL IntraVENous PRN Lauraine Cayden, DO        0.9 % sodium chloride infusion   IntraVENous PRN Lauraine Cayden, DO        promethazine (PHENERGAN) tablet 12.5 mg  12.5 mg Oral Q6H PRN Lauraine Cayden, DO        Or    ondansetron [facility]) injection 4 mg  4 mg IntraVENous Q6H PRN Lauraine Cayden, DO        polyethylene glycol (GLYCOLAX) packet 17 g  17 g Oral Daily PRN Lauraine Cayden, DO        acetaminophen (TYLENOL) tablet 650 mg  650 mg Oral Q6H PRN Lauraine Cayden, DO        Or    acetaminophen (TYLENOL) suppository 650 mg  650 mg Rectal Q6H PRN Lauraine Cayden, DO           REVIEW OF SYSTEMS:    CONSTITUTIONAL:  No chills, fevers or night sweats. No loss of weight.   EYES:  No double vision or drainage from eyes,
[address]        Date:2023                                                  Patient Name: Ladarius Quinn    MRN: [id number]    : 1935    Room: [address] [place] [address]      Evaluating OT: Rupa Mira, [address] OTR/L; [id number]      Referring Provider: Hunter So MD    Specific Provider Orders/Date: OT Eval and Treat 23      Diagnosis: Acute encephalopathy   Abnormal brain MRI    Surgery: none this admission     Pertinent Medical History:  has no past medical history on file.       Reason for Admission:  admitted for NS evaluation d/t abdominal MRI following  fall on  where she was found by her son    Recommended Adaptive Equipment:  TBD pending progression      Precautions:  Fall Risk, Bed Alarm, TSM, Cognition, Contact Isolation (C-Diff), Incontinent bowels/bladder     Assessment of current deficits:    [x] Functional mobility  [x]ADLs  [x] Strength               [x]Cognition    [x] Functional transfers   [x] IADLs         [x] Safety Awareness   [x]Endurance    [x] Fine Coordination              [x] Balance      [] Vision/perception   []Sensation     []Gross Motor Coordination  [] ROM  [] Delirium                   [] Motor Control     OT PLAN OF CARE   OT POC based on physician orders, patient diagnosis and results of clinical assessment    Frequency/Duration: 1-3 days/wk for 2 weeks PRN   Specific OT Treatment Interventions to include:   * Instruction/training on adapted ADL techniques and AE recommendations to increase functional independence within precautions       * Training on energy conservation strategies, correct breathing pattern and techniques to improve independence/tolerance for self-care routine  * Functional transfer/mobility training/DME recommendations for increased independence, safety, and fall prevention  * Patient/Family education to increase
[address] [place] [address] [facility]  Progress note  C/C: C diff infection       Pt is awake and alert  Still having diarrhea  Afebrile      Current Facility-Administered Medications   Medication Dose Route Frequency Provider Last Rate Last Admin    miconazole (MICOTIN) 2 % powder   Topical BID Suad Walker MD        miconazole nitrate 2 % ointment   Topical BID Suad Walker MD        enoxaparin (LOVENOX) injection 40 mg  40 mg SubCUTAneous Daily Suad Walker MD   40 mg at 05/08/23 0933    vancomycin (VANCOCIN) oral solution 125 mg  125 mg Oral 4 times per day Chip Edwards MD   125 mg at 05/08/23 1521    potassium bicarbonate (K-LYTE) disintegrating tablet 50 mEq  50 mEq Oral PRN Suad Walker MD   50 mEq at 05/06/23 1403    Or    potassium bicarb-citric acid (EFFER-K) effervescent tablet 40 mEq  40 mEq Oral PRN Suad Walker MD        Or    potassium chloride 10 mEq/100 mL IVPB (Peripheral Line)  10 mEq IntraVENous PRN Suad Walker MD        sodium chloride flush 0.9 % injection 10 mL  10 mL IntraVENous 2 times per day Pooja Blanks, DO   10 mL at 05/08/23 0933    sodium chloride flush 0.9 % injection 10 mL  10 mL IntraVENous PRN Pooja Blanks, DO        0.9 % sodium chloride infusion   IntraVENous PRN Pooja Blanks, DO        promethazine (PHENERGAN) tablet 12.5 mg  12.5 mg Oral Q6H PRN [place] Blanks, DO        Or    ondansetron [facility]) injection 4 mg  4 mg IntraVENous Q6H PRN [place] Blanks, DO        polyethylene glycol (GLYCOLAX) packet 17 g  17 g Oral Daily PRN Pooja Blanks, DO        acetaminophen (TYLENOL) tablet 650 mg  650 mg Oral Q6H PRN Pooja Blanks, DO        Or    acetaminophen (TYLENOL) suppository 650 mg  650 mg Rectal Q6H PRN [place] Blanks, DO           REVIEW OF SYSTEMS:    CONSTITUTIONAL:  No chills, fevers or night sweats. No loss of weight. EYES:  No double vision or drainage from eyes, ears or throat. HEENT:  No neck stiffness. No dysphagia.  No
Attempted to called nurse to nurse x4 to [facility]. Discharge info placed in envelope.
Hospitalist Progress Note      Synopsis: Patient admitted on 5/5/2023   Admitted with acute encephalopathy, C. difficile colitis. 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 Seen by neurosurgery no intervention. Outpatient follow-up. Patient is status post vancomycin and cefepime, acyclovir prior to transfer to [facility].  Mental status improving. Decreased diarrhea frequency. Currently on oral vancomycin. Infectious disease following. PT OT consulted for possible rehab. Noted to have hypernatremia which resolved with dextrose infusion. Patient still having frequent loose stools. Subjective    Patient seen earlier on. Noted to have 4 loose bowel movements since morning as per RN. Poor appetite today. Exam:  /68   Pulse 90   Temp 97.9 °F (36.6 °C) (Temporal)   Resp 18   Ht 4' 9\" (1.448 m)   Wt 151 lb (68.5 kg)   SpO2 96%   BMI 32.68 kg/m²   General appearance: No apparent distress, appears stated age and cooperative. HEENT: Atraumatic. Oral mucosa dry  Neck: Supple, with full range of motion. No jugular venous distention. Trachea midline. Respiratory: Clear to auscultation  Cardiovascular: RRR, heart sounds 1 and 2 only  Abdomen: Soft nontender nondistended  Musculoskeletal: No edema. Skin: Normal skin color. No rashes or lesions. Neurologic: No focal deficits noted. Alert oriented times to self and place.   Psychiatric: Awake and alert     Medications:  Reviewed    Infusion Medications    sodium chloride       Scheduled Medications    miconazole   Topical BID    enoxaparin  40 mg SubCUTAneous Daily    vancomycin  125 mg Oral 4 times per day    sodium chloride flush  10 mL IntraVENous 2 times per day     PRN Meds: potassium bicarbonate **OR** potassium alternative oral replacement **OR** potassium chloride, sodium chloride flush, sodium chloride, promethazine **OR** ondansetron, polyethylene glycol, acetaminophen **OR** acetaminophen    I/O    Intake/Output
Hospitalist Progress Note      Synopsis: Patient admitted on 5/5/2023   Admitted with acute encephalopathy, C. difficile colitis. 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 Seen by neurosurgery no intervention. Outpatient follow-up. Patient is status post vancomycin and cefepime, acyclovir prior to transfer to [facility].  Mental status improving. Decreased diarrhea frequency. Currently on oral vancomycin. Infectious disease following. PT OT consulted for possible rehab. Noted to have hypernatremia which resolved with dextrose infusion. Patient still having frequent loose stools. Subjective    Patient seen earlier this morning. No loose bowel movements since yesterday evening as per patient and RN. Discussed regarding awaiting placement. No acute concerns at this time      Exam:  /80   Pulse 89   Temp 98 °F (36.7 °C) (Temporal)   Resp 17   Ht 4' 9\" (1.448 m)   Wt 151 lb (68.5 kg)   SpO2 92%   BMI 32.68 kg/m²   General appearance: No apparent distress, appears stated age and cooperative. HEENT: Atraumatic. Oral mucosa dry  Neck: Supple, with full range of motion. No jugular venous distention. Trachea midline. Respiratory: Clear to auscultation  Cardiovascular: RRR, heart sounds 1 and 2 only  Abdomen: Soft nontender nondistended  Musculoskeletal: No edema. Skin: Normal skin color. No rashes or lesions. Neurologic: No focal deficits noted. Alert oriented times to self and place.   Psychiatric: Awake and alert     Medications:  Reviewed    Infusion Medications    sodium chloride       Scheduled Medications    miconazole   Topical BID    miconazole nitrate   Topical BID    enoxaparin  40 mg SubCUTAneous Daily    vancomycin  125 mg Oral 4 times per day    sodium chloride flush  10 mL IntraVENous 2 times per day     PRN Meds: potassium bicarbonate **OR** potassium alternative oral replacement **OR** potassium chloride, sodium chloride flush, sodium chloride,
Hospitalist Progress Note      Synopsis: Patient admitted on 5/5/2023   Admitted with acute encephalopathy, C. difficile colitis. 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 Seen by neurosurgery no intervention. Outpatient follow-up. Patient is status post vancomycin and cefepime, acyclovir prior to transfer to [facility].  Mental status improving. Decreased diarrhea frequency. Currently on oral vancomycin until 5/15/2023. Infectious disease were on board during admission. Noted to have hypernatremia which resolved with dextrose infusion. Patient improved with the loose stools and electrolyte abnormalities. PT/OT were consulted and patient was deemed stable for discharge to subacute rehab. Patient did have left upper extremity swelling and pain-ultrasound was ordered to rule out DVT which was neg. Subjective    Patient seen earlier this morning. No loose bowel movements ,awaiting precert. Exam:  /71   Pulse 90   Temp 98.4 °F (36.9 °C) (Temporal)   Resp 16   Ht 4' 9\" (1.448 m)   Wt 151 lb (68.5 kg)   SpO2 96%   BMI 32.68 kg/m²   General appearance: No apparent distress, appears stated age and cooperative. HEENT: Atraumatic. Neck: Supple, with full range of motion. No jugular venous distention. Trachea midline. Respiratory: Clear to auscultation  Cardiovascular: RRR, heart sounds 1 and 2 only  Abdomen: Soft nontender nondistended  Musculoskeletal: No edema. Skin: Normal skin color. No rashes or lesions. Neurologic: No focal deficits noted. Alert oriented times to self and place.   Psychiatric: Awake and alert     Medications:  Reviewed    Infusion Medications    sodium chloride       Scheduled Medications    lactobacillus  1 capsule Oral Daily    miconazole   Topical BID    miconazole nitrate   Topical BID    enoxaparin  40 mg SubCUTAneous Daily    vancomycin  125 mg Oral 4 times per day    sodium chloride flush  10 mL IntraVENous 2 times per day     PRN Meds:
IV removed from R AC, iv site yellow with scant purulent drainage. Notified attending physician, orders for wound culture placed and culture sent to lab.
Physician Progress Note      Ashley Perez  CSN #:                  [id number]  :                       1935  ADMIT DATE:       2023 8:57 PM  [address] [place] DATE:  RESPONDING  PROVIDER #:        Netta Pino MD          QUERY TEXT:    Dear Provider,    Pt admitted with c difficile colitis. Noted WBC 12.5, Alk phos 137, ALT 39,   AST 39, Glucose 112-121-109 in non diabetic patient. If possible, please   document in the progress notes and discharge summary if you are evaluating and   /or treating any of the following: The medical record reflects the following:  Risk Factors: 81 yo w/C diff colitis  Clinical Indicators: WBC 12.5, Alk phos 137, ALT 39, AST 39, Glucose   112-121-109 in non diabetic patient. Last A1C- 5.5( )   ID:  C. difficile colitis improving with p.o. vancomycin,   Leukocytosis   improved  Treatment: Po vancomycin, Labs, ID consult    Thank you,  Jefe Elizabeth RN  Clinical Documentation Improvement  [phone]  Options provided:  -- Sepsis, present on admission  -- C difficile colitis without Sepsis  -- Other - I will add my own diagnosis  -- Disagree - Not applicable / Not valid  -- Disagree - Clinically unable to determine / Unknown  -- Refer to Clinical Documentation Reviewer    PROVIDER RESPONSE TEXT:    This patient has c difficile colitis without sepsis.     Query created by: Radha Erazo on 5/10/2023 2:01 PM      Electronically signed by:  Netta Pino MD 2023 7:58 AM
Physician Progress Note      Nima Dubois  [facility] #:                  [id number]  :                       1935  ADMIT DATE:       2023 8:57 PM  [address] [place] [facility] DATE:  RESPONDING  PROVIDER #:        Waleska Sexton MD          QUERY TEXT:    Dear Provider,    Pt admitted with C difficile colitis and has acute encephalopathy documented. If possible, please document in progress notes and discharge summary further   specificity regarding the type of encephalopathy:    The medical record reflects the following:  Risk Factors:  C. difficile colitis  Clinical Indicators: Documented Acute encephalopathy improving. H&P  noted   confused, alert, oriented  x2. Normal baseline is alert and oriented x3, lives per self and drives. Neurosurgery CN  noted Disoriented to place and time. This is less likely   a low-grade  glioma given her advanced age of 82, likely of a low-grade glioma in an   82-year-old is on the lower end of the  spectrum.  ID:  Reported to be C. difficile positive on . Diarrhea has improved   for the last 3 days. ID consulted  for C. difficile colitis. Continue po vancomycin   MRI head: Small focal area of T2/FLAIR signal prolongation within the    paramedian high right temporoparietal region at the  vertex. This is  incompletely characterized on this noncontrast examination,   and could  reflect a focal area of gliosis versus  vasogenic edema secondary to an underlying lesion. Recommend further   evaluation with postcontrast MRI of the brain.  MRI brain: Again seen is the area of T2 hyperintensity in the high right   frontal lobe. This involves primarily  white matter, does not enhance  and does not cause any mass effect. The   etiology of this is unclear. ?? ? This could represent gliosis from previous infarct or injury.  However, I   cannot rule out a low-grade glioma  Treatment: Imaging, Labs, Neurosurgery and ID consult, po vancomycin    Thank
ASSESSMENT:    Conditions Requiring Skilled Therapeutic Intervention:    [x]Decreased strength     []Decreased ROM  [x]Decreased functional mobility  [x]Decreased balance   [x]Decreased endurance   [x]Decreased posture  []Decreased sensation  []Decreased coordination   []Decreased vision  [x]Decreased safety awareness   []Increased pain       Comments:  Pt supine in bed upon entering, agreeable to participate. Pt assisted with hygiene as she was found to have soiled pads upon entering, pt cued for multiple log rolls to provided assistance. Pt was then transferred to EOB, support provided with advancing BLE and trunk. Pt sitting upright with good static sitting balance. Pt was instructed to stand from EOB, demonstrating retro lean, and was unable to shift weight to stand without leaning against bed frame. Pt side shuffled toward [facility] with cues, arm in arm A provided for pt safety. Pt was assisted back to bed and transferred to supine. Pt positioned for comfort with all needs met and call bell in reach prior to exiting. Treatment:  Patient practiced and was instructed in the following treatment:    Bed mobility training - pt given verbal and tactile cues to facilitate proper sequencing and safety during rolling and supine>sit as well as provided with physical assistance to complete task   Sitting EOB for >7 minutes for upright tolerance, postural awareness and BLE ROM  STS transfer training - pt educated on proper hand and foot placement, safety and sequencing, and use of verbal and tactile cues to safely complete sit<>stand transfers with physical assistance to complete task safely   Skilled positioning - Pt placed in the chair position with pillows utilized to facilitate upright posture, joint and skin integrity, and interaction with environment. Pt's/ family goals   1. Return home    Prognosis is fair for reaching above PT goals.     Patient and or family understand(s) diagnosis, prognosis, and plan of
05/10/2023 09:20 AM    K 4.0 05/10/2023 09:20 AM     05/10/2023 09:20 AM    CO2 23 05/10/2023 09:20 AM    BUN 10 05/10/2023 09:20 AM    CREATININE 0.5 05/10/2023 09:20 AM    GFRAA > 60 05/05/2023 05:20 AM    AGRATIO 1.4 12/10/2021 06:47 AM    LABGLOM >60 05/10/2023 09:20 AM    GLUCOSE 83 05/10/2023 09:20 AM    GLUCOSE 108 05/05/2023 05:20 AM    PROT 5.0 05/06/2023 06:25 AM    LABALBU 2.4 05/06/2023 06:25 AM    LABALBU 2.0 05/03/2023 05:02 AM    CALCIUM 7.3 05/10/2023 09:20 AM    BILITOT 1.0 05/06/2023 06:25 AM    BILITOT Negative 04/28/2023 03:00 AM    ALKPHOS 137 05/06/2023 06:25 AM    AST 39 05/06/2023 06:25 AM    ALT 39 05/06/2023 06:25 AM       Lab Results   Component Value Date/Time    PROTIME 11.7 04/27/2023 04:36 AM    INR 1.1 04/27/2023 04:36 AM       Lab Results   Component Value Date/Time    TSH 1.433 04/25/2023 05:14 AM       Lab Results   Component Value Date/Time    COLORU Yellow 04/28/2023 03:00 AM    WBCUA 16-20 04/28/2023 03:00 AM    RBCUA 5-10 04/28/2023 03:00 AM    BACTERIA 4+ 04/28/2023 03:00 AM    CLARITYU Cloudy 04/28/2023 03:00 AM    SPECGRAV 1.015 04/28/2023 03:00 AM    LEUKOCYTESUR 2+ 04/28/2023 03:00 AM    UROBILINOGEN 1.0 04/28/2023 03:00 AM       No results found for: HTK0QFP, BEART, F0ULVEJF, PHART, THGBART, HAA0BUU, PO2ART, LBR6FBB  Radiology:    Ultrasound :      Impression:        No evidence of DVT.         Assessment:    C. difficile colitis improving with p.o. vancomycin  Leukocytosis improved       Plan:    Continue p.o. vancomycin 125 mg po q 6 hrs ( till 5/15)   Probiotics   Awaiting precert       Electronically signed by Jessica Austin MD on 5/10/2023 at 3:07 PM

## 2023-05-11 NOTE — PLAN OF CARE
Problem: Safety - Adult  Goal: Free from fall injury  5/11/2023 0950 by Leah Hathaway RN  Outcome: Progressing  5/10/2023 2138 by Lucille Stephens RN  Outcome: Progressing

## 2023-05-11 NOTE — CARE COORDINATION
Discharge order noted. Auth obtained for [facility]. Transport arranged with [facility] @ 11:00am. HENS, ambulance form and envelope on soft chart.  Sister, facility and nurse updated (TF)    Fern Foley, [facility]  Case Management  [phone]

## 2023-05-12 LAB
BACTERIA WND AEROBE CULT: ABNORMAL
BACTERIA WND AEROBE CULT: ABNORMAL
ORGANISM: ABNORMAL
ORGANISM: ABNORMAL

## 2024-06-04 DIAGNOSIS — R00.1 SINUS BRADYCARDIA: ICD-10-CM

## 2024-06-04 DIAGNOSIS — R42 DIZZINESS: Primary | ICD-10-CM

## 2024-06-05 ENCOUNTER — NURSE ONLY (OUTPATIENT)
Dept: CARDIOLOGY CLINIC | Age: 89
End: 2024-06-05

## 2024-06-05 NOTE — PROGRESS NOTES
Patient was seen today for a heart monitor placement ordered by Dr. lutz    Monitor type: ZIO XT  Duration: 14  Serial number: [id number]    The monitor was applied and instructions were given.    Shey Perales MA

## 2024-07-17 ENCOUNTER — TELEPHONE (OUTPATIENT)
Dept: CARDIOLOGY CLINIC | Age: 89
End: 2024-07-17

## 2024-07-17 DIAGNOSIS — R42 DIZZINESS: ICD-10-CM

## 2024-07-17 DIAGNOSIS — R00.1 SINUS BRADYCARDIA: ICD-10-CM

## 2024-07-30 PROCEDURE — 88305 TISSUE EXAM BY PATHOLOGIST: CPT

## 2024-07-31 ENCOUNTER — LAB REQUISITION (OUTPATIENT)
Dept: DERMATOPATHOLOGY | Facility: CLINIC | Age: 89
End: 2024-07-31
Payer: MEDICARE

## 2024-07-31 DIAGNOSIS — D48.5 NEOPLASM OF UNCERTAIN BEHAVIOR OF SKIN: ICD-10-CM

## 2024-08-01 LAB
LABORATORY COMMENT REPORT: NORMAL
PATH REPORT.FINAL DX SPEC: NORMAL
PATH REPORT.GROSS SPEC: NORMAL
PATH REPORT.RELEVANT HX SPEC: NORMAL
PATH REPORT.TOTAL CANCER: NORMAL

## 2024-08-21 ENCOUNTER — OFFICE VISIT (OUTPATIENT)
Dept: CARDIOLOGY CLINIC | Age: 89
End: 2024-08-21
Payer: MEDICARE

## 2024-08-21 VITALS
RESPIRATION RATE: 18 BRPM | BODY MASS INDEX: 32.79 KG/M2 | DIASTOLIC BLOOD PRESSURE: 74 MMHG | SYSTOLIC BLOOD PRESSURE: 140 MMHG | WEIGHT: 152 LBS | HEIGHT: 57 IN | HEART RATE: 96 BPM

## 2024-08-21 DIAGNOSIS — I47.10 PSVT (PAROXYSMAL SUPRAVENTRICULAR TACHYCARDIA) (HCC): ICD-10-CM

## 2024-08-21 DIAGNOSIS — I10 PRIMARY HYPERTENSION: ICD-10-CM

## 2024-08-21 DIAGNOSIS — I50.32 CHRONIC HEART FAILURE WITH PRESERVED EJECTION FRACTION (HFPEF) (HCC): ICD-10-CM

## 2024-08-21 DIAGNOSIS — Z86.79 HISTORY OF SINUS BRADYCARDIA: Primary | ICD-10-CM

## 2024-08-21 PROCEDURE — 93000 ELECTROCARDIOGRAM COMPLETE: CPT | Performed by: INTERNAL MEDICINE

## 2024-08-21 PROCEDURE — 99214 OFFICE O/P EST MOD 30 MIN: CPT | Performed by: INTERNAL MEDICINE

## 2024-08-21 PROCEDURE — G8427 DOCREV CUR MEDS BY ELIG CLIN: HCPCS | Performed by: INTERNAL MEDICINE

## 2024-08-21 PROCEDURE — 1123F ACP DISCUSS/DSCN MKR DOCD: CPT | Performed by: INTERNAL MEDICINE

## 2024-08-21 PROCEDURE — 1090F PRES/ABSN URINE INCON ASSESS: CPT | Performed by: INTERNAL MEDICINE

## 2024-08-21 PROCEDURE — 4004F PT TOBACCO SCREEN RCVD TLK: CPT | Performed by: INTERNAL MEDICINE

## 2024-08-21 PROCEDURE — G8417 CALC BMI ABV UP PARAM F/U: HCPCS | Performed by: INTERNAL MEDICINE

## 2024-08-21 RX ORDER — ATORVASTATIN CALCIUM 40 MG/1
40 TABLET, FILM COATED ORAL
COMMUNITY
Start: 2023-05-18

## 2024-08-21 RX ORDER — METOPROLOL SUCCINATE 25 MG/1
12.5 TABLET, EXTENDED RELEASE ORAL DAILY
Qty: 45 TABLET | Refills: 2 | Status: SHIPPED | OUTPATIENT
Start: 2024-08-21

## 2024-08-21 RX ORDER — ALENDRONATE SODIUM 70 MG/1
70 TABLET ORAL
COMMUNITY
Start: 2023-05-21

## 2024-08-21 RX ORDER — FAMOTIDINE 20 MG/1
20 TABLET, FILM COATED ORAL EVERY 12 HOURS
COMMUNITY
Start: 2023-05-17

## 2024-08-21 RX ORDER — ERGOCALCIFEROL 1.25 MG/1
1 CAPSULE, LIQUID FILLED ORAL WEEKLY
COMMUNITY
Start: 2024-02-01

## 2024-08-21 RX ORDER — LOSARTAN POTASSIUM AND HYDROCHLOROTHIAZIDE 12.5; 1 MG/1; MG/1
1 TABLET ORAL
COMMUNITY
Start: 2023-05-18

## 2024-08-21 RX ORDER — BUMETANIDE 1 MG/1
1 TABLET ORAL DAILY
COMMUNITY
Start: 2024-05-23

## 2024-08-21 RX ORDER — CEPHALEXIN 500 MG/1
500 CAPSULE ORAL 2 TIMES DAILY
COMMUNITY
Start: 2024-06-04

## 2024-08-21 NOTE — PROGRESS NOTES
family history on file.       Social History     Tobacco Use    Smoking status: Never    Smokeless tobacco: Never   Substance Use Topics    Alcohol use: Not Currently         Review of Systems:    Constitutional: negative for fever and chills, or significant weight loss  HEENT: no acute visual symptoms, dysphagia  Respiratory: negative for cough or hemoptysis  Cardiovascular: negative for chest pain, palpitations, dyspnea  Gastrointestinal: negative for abdominal pain, nausea  Endocrine: Negative for polyuria and polydyspsia  Genitourinary: negative for dysuria   Derm: negative for rash   Neurological: negative for tingling, numbness, weakness  Endocrine: negative for polyuria  Musculoskeletal: negative for pain or tenderness  Psychiatric: negative for anxiety, depression         O:  COMPLETE PHYSICAL EXAM:       BP (!) 140/74   Pulse 96   Resp 18   Ht 1.448 m (4' 9\")   Wt 68.9 kg (152 lb)   BMI 32.89 kg/m²       General:   Patient alert, comfortable, no distress. Appears stated age.   HEENT:    Pupils equal, no icterus; Tongue moist.   Neck:              Thyroid not palpable. No elevated JVD, No carotid bruit.   Chest:   Normal configuration, non tender.   Lungs:   Mostly clear to auscultation bilaterally; few scattered rhonchi.   Cardiovascular:  Regular rhythm, 1/6 systolic murmur, No S3, no palpable thrills.    Abdomen:  Soft, Bowel sounds normal, can not feel pulsatile abdominal aorta   Extremities:  + edema. Distal pulses palpable. No cyanosis   Skin:   Good turgor, warm and dry, no cyanosis.    Musculoskeletal: No joint swelling or deformity.   Neuro:   Cranial nerves grossly intact; No focal neurologic deficit.   Psych:   Alert, good mood and effect.     REVIEW OF DIAGNOSTIC TESTS:        Electrocardiogram: Reviewed      Labs:  Reviewed as available  2D Echo: Noted  Conclusion       The left ventricle is normal size.        There is normal LV segmental wall motion.        Left ventricular systolic  function is normal, EF 65-70%.        Right ventricular systolic function is normal, TAPSE 1.9cm.        There is no aortic valvular stenosis.        The aortic root is normal in size.        There is no pericardial effusion.        Anterior epicardial fat pad.        Prior echo done iin 4/2023.     Event monitor:  June 2024 - Noted. Brief SVT, sinus selma           ASSESSMENT / PLAN:    Cassy was seen today for congestive heart failure.    Diagnoses and all orders for this visit:    History of sinus bradycardia - Stable  -     EKG 12 lead    PSVT (paroxysmal supraventricular tachycardia) (HCC) - Stable on BB. Avoid caffeine  -     EKG 12 lead    Primary hypertension - Controlled  -     EKG 12 lead    Chronic heart failure with preserved ejection fraction (HFpEF) (HCC) - Euvolemic. Continue diuretics. Consider SGLT-2i    Dyslipidemia - On Statin, per her PCP    Preventive Cardiology: Low cholesterol diet, regular exercise as tolerate, and gradual weight loss discussed.    Other orders  -     EKG 12 lead  -     metoprolol succinate (TOPROL XL) 25 MG extended release tablet; Take 0.5 tablets by mouth daily     Above recommendations discussed with her and her daughter   Current medications, allergies, and results of prior tests (as available) were reviewed.   All questions answered about cardiac diagnoses and cardiac medications.   Continue current medications. Monitor BP and heart rates.              Compliance with medications and f/u with physicians discussed.   Risk factor modification based on risk profile discussed.   Advised to call for exertional chest pains, short of breath; dizzy or palpitations.   Follow up in 6 months or earlier if needed.         [facility]  [address] [place] [address]  [phone]

## 2024-08-28 PROCEDURE — 88305 TISSUE EXAM BY PATHOLOGIST: CPT

## 2024-08-29 ENCOUNTER — LAB REQUISITION (OUTPATIENT)
Dept: DERMATOPATHOLOGY | Facility: CLINIC | Age: 89
End: 2024-08-29
Payer: MEDICARE

## 2024-08-29 DIAGNOSIS — C44.42 SQUAMOUS CELL CARCINOMA OF SKIN OF SCALP AND NECK: ICD-10-CM

## 2024-08-30 LAB
LABORATORY COMMENT REPORT: NORMAL
PATH REPORT.FINAL DX SPEC: NORMAL
PATH REPORT.GROSS SPEC: NORMAL
PATH REPORT.MICROSCOPIC SPEC OTHER STN: NORMAL
PATH REPORT.RELEVANT HX SPEC: NORMAL
PATH REPORT.TOTAL CANCER: NORMAL

## 2025-04-30 PROCEDURE — 88305 TISSUE EXAM BY PATHOLOGIST: CPT | Performed by: DERMATOLOGY

## 2025-05-01 ENCOUNTER — LAB REQUISITION (OUTPATIENT)
Dept: DERMATOPATHOLOGY | Facility: CLINIC | Age: 89
End: 2025-05-01
Payer: MEDICARE

## 2025-05-01 DIAGNOSIS — D48.5 NEOPLASM OF UNCERTAIN BEHAVIOR OF SKIN: ICD-10-CM
